# Patient Record
Sex: MALE | Race: WHITE | Employment: OTHER | URBAN - METROPOLITAN AREA
[De-identification: names, ages, dates, MRNs, and addresses within clinical notes are randomized per-mention and may not be internally consistent; named-entity substitution may affect disease eponyms.]

---

## 2023-02-06 ENCOUNTER — OFFICE VISIT (OUTPATIENT)
Dept: ORTHOPEDIC SURGERY | Age: 78
End: 2023-02-06

## 2023-02-06 VITALS — BODY MASS INDEX: 29.96 KG/M2 | WEIGHT: 214 LBS | HEIGHT: 71 IN

## 2023-02-06 DIAGNOSIS — M19.072 PRIMARY OSTEOARTHRITIS OF LEFT FOOT: ICD-10-CM

## 2023-02-06 DIAGNOSIS — L03.119 CELLULITIS OF FOOT: ICD-10-CM

## 2023-02-06 DIAGNOSIS — M20.5X2 ACQUIRED CLAW TOE OF LEFT FOOT: ICD-10-CM

## 2023-02-06 DIAGNOSIS — M79.675 PAIN OF TOE OF LEFT FOOT: Primary | ICD-10-CM

## 2023-02-06 DIAGNOSIS — M20.22 HALLUX RIGIDUS OF LEFT FOOT: ICD-10-CM

## 2023-02-06 RX ORDER — PIOGLITAZONEHYDROCHLORIDE 45 MG/1
TABLET ORAL
COMMUNITY
Start: 2022-12-29

## 2023-02-06 RX ORDER — COLCHICINE 0.6 MG/1
0.6 TABLET ORAL 2 TIMES DAILY
Qty: 28 TABLET | Refills: 0 | Status: SHIPPED | OUTPATIENT
Start: 2023-02-06 | End: 2023-02-20

## 2023-02-06 RX ORDER — INSULIN ASPART 100 [IU]/ML
INJECTION, SOLUTION INTRAVENOUS; SUBCUTANEOUS
COMMUNITY
Start: 2022-11-28

## 2023-02-06 RX ORDER — AMOXICILLIN AND CLAVULANATE POTASSIUM 875; 125 MG/1; MG/1
1 TABLET, FILM COATED ORAL 2 TIMES DAILY
Qty: 28 TABLET | Refills: 2 | Status: SHIPPED | OUTPATIENT
Start: 2023-02-06 | End: 2023-02-20

## 2023-02-06 RX ORDER — INSULIN DETEMIR 100 [IU]/ML
65 INJECTION, SOLUTION SUBCUTANEOUS 2 TIMES DAILY
COMMUNITY
Start: 2022-04-29

## 2023-02-06 NOTE — PROGRESS NOTES
Name: Coy Krishna  YOB: 1945  Gender: male  MRN: [id number]     CC: Left foot pain    HPI:   02/03/2023: He was bird hunting and noticed left foot pain. 02/06/2023: Presents with a left dorsal 1-2 forefoot pain    ROS/Meds/PSH/PMH/FH/SH: reviewed today    Tobacco:  reports that he has quit smoking. His smoking use included cigarettes. He has quit using smokeless tobacco.     Physical Examination:  Patient appears to be alert and oriented with acceptable appearance. No obvious distress or SOB  CV: appears to have acceptable vascular color and capillary refill  Neuro: appears to have mostly intact light touch sensation   Skin: Left 1-2 dorsal webspace redness; no open lesions; no webspace moisture; no eschar   MS: Standing: Plantigrade: Gait protective  Left = 1-2 webspace/first MTP pain; no lesser metatarsal pain    XR: Left side: Standing AP lateral oblique foot taken today with hallux rigidus; retained fibular plate; tibial 2-5 toes; healed second metatarsal stress fracture  XR Impression:  As above      Reviewed Test/Records/Documents:   06/06/2012: Left lateral malleolar ORIF Dr. Leatha Mcqueen  06/06/2022: Dr. Heavenly Hooper reflects: Left second toe pressure. Discussed taping and insoles    Injection: Not applicable    Assessment:    Left dorsal 1-2 webspace/forefoot pain, redness, inflammation, cellulitis  Left hallux rigidus     Plan:   The patient and I discussed the above assessment. We explored treatment options.      He irritated his foot bird hunting   Potentially this is a gout arthritic flare or stress fracture, but with the degree of swelling and redness in the dorsal 1-2 webspace, I am concerned about infection  He has no evidence for webspace maceration ulcer and no evidence of any penetrating foreign body  I hope to resolve his situation with both Colchicine and Augmentin as well as protecting with 3D boot    Advanced medical imaging: No indication today for MRI scan  DME: Right even up: Left 3D boot  We discussed foot care and boot protection  Orthotic/prosthetic: Potential future       Medication - OTC meds prn: Prescribed:  Augmentin 875 m p.o. twice daily: #14 days: 2 refills  Colchicine 0.6 m p.o. twice daily: # 14 days: No refills  We discussed his diabetes and kidneys. He will check with Dr. Tiny Bolanos regarding the Colchicine   Surgical discussion: No indication today for surgery  Follow up: 1 week: X-rays left foot standing  Work status: Out of work    This note was created using Dragon voice recognition software which may result in errors of speech and spelling recognition and word/phrase syntax errors.

## 2023-02-06 NOTE — LETTER
DME Patient Authorization Form    Name: Rosangela Boucher  : 1945  MRN: [id number]   Age: 65 y.o. Gender: male  Delivery Address: [facility]     Diagnosis:     ICD-10-CM    1. Pain of toe of left foot  M79.675 XR FOOT LEFT (MIN 3 VIEWS)     Walker Boot ()     Even UP ()      2. Acquired claw toe of left foot  M20.5X2 Walker Boot ()     Even UP ()      3. Primary osteoarthritis of left foot  M19.072 Walker Boot ()     Even UP ()      4. Hallux rigidus of left foot  M20.22 Walker Boot ()     Even UP ()      5. Cellulitis of foot  L03.119 Gamaliel Gains ()     Even UP ()           Requested DME:  Even-Ups - ** ($30) X 1 - right  Walker Boot -  ($290.00) X 1 - left        Clinical Notes:     **Indicates non-covered items by insurance. Payment expected on date of service. Electronically signed by  Provider: Elroy Geronimo MD__Date: 2023                            [facility] Tax ID # [id number]        Durable Medical Equipment and/or Orthotics Patient Consent     I understand that my physician has prescribed this medical supply as part of my treatment plan as a matter of Medical Necessity.  I understand that I have a choice in where I receive my prescribed orthopedic supplies and/or services.  I authorize [facility] to furnish this service/product and to provide my insurance carrier with any information requested in order to process for payment.  I instruct my insurance carrier to pay [facility] directly for these services/products.  I understand that my insurance carrier may deny payment for this supply because it is a non-covered item, deemed not medically necessary or considered experimental.   I understand that any cost not covered by my insurance carrier will be solely my financial responsibility.    I have received the Supplier Standards and have reviewed them.  I have received the prescribed item and have been fully instructed on the proper use of the above services/products.    ______ (Patient Initials) I understand that all DME items are non-returnable after being dispensed. Items still in sealed packaging may be returned up to 14 days after purchasing. [address] will replace items that are defective.    ______ (Patient Initials) I understand that [facility] will not file a claim with my insurance carrier for this service/product and I am waiving my right to file a claim on my own for this service/product with my insurance company as this item is NON-COVERED (Denoted by the **) by my Insurance company/policy. ______ (Patient Initials) I understand that I am responsible to bring my equipment to the hospital for any surgery. ______________________________________________  ________________________  Patient / Alisa Martinez            Thank you for considering [address] Your physician has prescribed specific medical equipment or devices for your home use. The following describes your rights and responsibilities as our customer. Right to Choose Providers: You have a choice regarding which company supplies your home medical equipment and devices, and to consult your physician in this decision. You may choose a medical supply store, a home medical equipment provider, or a specialist such as [facility]. [facility] will coordinate with your physician to provide the medical equipment or devices prescribed for your home use. Right to Service:  You have the right to considerate, respectful and nondiscriminatory care. You have the right to receive accurate and easily understood information about your health care.   If you speak a foreign language, or don't understand the discussions, assistance will be provided to allow you to make informed health care decisions. You have the right to know your treatment options and to participate in decisions about your care, including the right to accept or refuse treatment. You have the right to expect a reasonable response to your requests for treatment or service. You have the right to talk in confidence with health care providers and to have your health care information protected. You have the right to receive an explanation of your bill. You have the right to complain about the service or product you receive. Patient Responsibilities:  Please provide complete and accurate information about your health insurance benefits and make arrangements for the timely payment of your bill. [facility] will, if possible, assume responsibility for billing your insurance (Medicare, Medicaid and commercial) for the prescribed equipment or devices. If your policy does not cover the prescribed product, or only covers a portion of the bill, you are responsible for any remaining balance. Return and Exchange Policy:  [facility] will honor published  Warranties for products. [facility] will accept returns or exchanges within 14 days from the date of receipt, providin) the product must be in new condition; 2) receipt as required; and 3) used disposable and hygiene products may only be returned due to a defective product. Note: Refunds will be issued in a timely manner, please allow 4-6 weeks for processing. Complaint Procedures and DME Consumer Protection Resources:  [facility] values you as a customer, and is committed to resolving patient concerns. This commitment includes understanding and documenting your concerns, conducting a review of internal procedures, and providing you with an explanation and resolution to your concerns.   Should you have any questions about our services or billing process, please contact our office at (practice phone number). If we are unable to resolve the concern, you have the right to direct comments to the office of Consumer Protection, in the [address] or the [facility] office, without fear of repercussion. DMEPOS SUPPLIER STANDARDS    A supplier must be in compliance with all applicable Federal and [facility] and regulatory requirements. A supplier must provide complete and accurate information on the DMEPOS supplier application. Any changes to this information must be reported to the [facility] within 30 days. An authorized individual (one whose signature is binding) must sign the application for billing privileges. A supplier must fill orders from its own inventory, or must contract with other companies for the purchase of items necessary to fill the order. A supplier may not contract with any entity that is currently excluded from the Medicare program, any [facility] program, or from any other Federal procurement or Nonprocurement programs. A supplier must advise beneficiaries that they may rent or purchase inexpensive or routinely purchased durable medical equipment, and of the purchase option for capped rental equipment. A supplier must notify beneficiaries of warranty coverage and honor all warranties under applicable State Law, and repair or replace free of charge Medicare covered items that are under warranty. A supplier must maintain a physical facility on an appropriate site. A supplier must permit CMS, or its agents to conduct on-site inspections to ascertain the supplier's compliance with these standards. The supplier location must be accessible to beneficiaries during reasonable business hours, and must maintain a visible sign and posted hours of operation.   A supplier must maintain a primary business telephone listed under the name of the business in a [employer] or a toll free number available through directory assistance. The exclusive use of a beeper, answering machine or cell phone is prohibited. A supplier must have comprehensive liability insurance in the amount of at least $300,000 that covers both the supplier's place of business and all customers and employees of the supplier. If the supplier manufactures its own items, this insurance must also cover product liability and completed operations. A supplier must agree not to initiate telephone contact with beneficiaries, with a few exceptions allowed. This standard prohibits suppliers from calling beneficiaries in order to solicit new business. A supplier is responsible for delivery and must instruct beneficiaries on use of Medicare covered items, and maintain proof of delivery. A supplier must answer questions, and respond to complaints of the beneficiaries, and maintain documentation of such contacts. A supplier must maintain and replace at no charge or repair directly, or through a service contract with another company, Medicare covered items it has rented to beneficiaries. A supplier must accept returns of substandard (less than full quality for the particular item) or unsuitable items (inappropriate for the beneficiary at the time it was fitted and rented or sold) from beneficiaries. A supplier must disclose these supplier standards to each beneficiary to whom it supplies a Medicare-covered item. A supplier must disclose to the government any person having ownership, financial, or control interest in the supplier. A supplier must not convey or reassign a supplier number; i.e., the supplier may not sell or allow another entity to use its Medicare billing number. A supplier must have a complaint resolution protocol established to address beneficiary complaints that relate to these standards. A record of these complaints must be maintained at the physical facility.   Complaint records must include: the name, address, telephone number and health insurance claim number of the beneficiary, a summary of the complaint, and any action taken to resolve it. A supplier must agree to furnish CMS any information required by the Medicare statute and implementing regulations. A supplier of DMEPOS and other items and services must be accredited by a CMS-approved accreditation organization in order to receive and retain a supplier billing number. The accreditation must indicate the specific products and services, for which the supplier is accredited in order for the supplier to receive payment for those specific products and services. A DMEPOS supplier must notify their accreditation organization when a new DMEPOS location is opened. The accreditation organization may accredit the new supplier location for three months after it is operational without requiring a new site visit. All DMEPOS supplier locations, whether owned or subcontracted, must meet the [employer] and be separately accredited in order to bill Medicare. An accredited supplier may be denied enrollment or their enrollment may be revoked, if CMS determines that they are not in compliance with the DMEPOS quality standards. A DMEPOS supplier must disclose upon enrollment all products and services, including the addition of new product lines for which they are seeking accreditation. If a new product line is added after enrollment, the DMEPOS supplier will be responsible for notifying the accrediting body of the new product so that the DMEPOS supplier can be re-surveyed and accredited for these new products. Must meet the surety bond requirements specified in 42 C. F.R. 424.57(c). Implementation date- May 4, 2009. A supplier must obtain oxygen from a state-licensed oxygen supplier. A supplier must maintain ordering and referring documentation consistent with provisions found in 42 C. F.R. 424.516(f).   DMEPOS suppliers are prohibited from sharing a practice location with certain other Medicare providers and suppliers. DMEPOS suppliers must remain open to the public for a minimum of 30 hours per week with certain exceptions.

## 2023-02-06 NOTE — PROGRESS NOTES
The patient was prescribed a walker boot for the patient's left foot. The patient wears a size 11 shoe and I fitted them with a L size boot. The patient was fitted and instructed on the use of prescribed walker boot. I explained how to fit themselves and that the plastic flexible piece should always be on the front of the boot and secured by the Velcro straps on top. The air bladder in the boot was adjusted according to proper fit and comfort. The patient walked a short distance and acknowledged satisfaction with current fit. I also explained that they need a heel lift or a higher heeled shoe for the uninvolved LE to help normalize gait and avoid excessive low back stress/strain due to leg length inequality created from walker boot. The patient was also prescribed an even-up for the right side, size large. Patient read and signed documenting they understand and agree to [facility]'s current DME return policy. negative Alert & oriented; no sensory, motor or coordination deficits, normal reflexes

## 2023-02-13 ENCOUNTER — OFFICE VISIT (OUTPATIENT)
Dept: ORTHOPEDIC SURGERY | Age: 78
End: 2023-02-13

## 2023-02-13 VITALS — HEIGHT: 71 IN | WEIGHT: 214 LBS | BODY MASS INDEX: 29.96 KG/M2

## 2023-02-13 DIAGNOSIS — M20.5X2 ACQUIRED CLAW TOE OF LEFT FOOT: ICD-10-CM

## 2023-02-13 DIAGNOSIS — M79.675 PAIN OF TOE OF LEFT FOOT: Primary | ICD-10-CM

## 2023-02-13 DIAGNOSIS — M19.072 PRIMARY OSTEOARTHRITIS OF LEFT FOOT: ICD-10-CM

## 2023-02-13 DIAGNOSIS — M20.22 HALLUX RIGIDUS OF LEFT FOOT: ICD-10-CM

## 2023-02-13 DIAGNOSIS — L03.119 CELLULITIS OF FOOT: ICD-10-CM

## 2023-02-13 NOTE — PROGRESS NOTES
The patient was prescribed and fitted with a carbon fiber shoe insert for the left foot, size large. Patient read and signed documenting they understand and agree to [facility]'s current DME return policy.

## 2023-02-13 NOTE — PROGRESS NOTES
Name: Sravani Briceño  YOB: 1945  Gender: male  MRN: [id number]     02/13/2023: He returns pain-free. HPI:   02/03/2023: He was bird hunting and noticed left foot pain. 02/06/2023: Initial visit for: Left dorsal 1-2 forefoot pain    ROS/Meds/PSH/PMH/FH/SH: reviewed today    Tobacco:  reports that he has quit smoking. His smoking use included cigarettes. He has quit using smokeless tobacco.     Physical Examination:  Patient appears to be alert and oriented with acceptable appearance. No obvious distress or SOB  CV: appears to have acceptable vascular color and capillary refill  Neuro: appears to have mostly intact light touch sensation   Skin: Resolved left 1-2 dorsal webspace redness; no webspace moisture   MS: Standing: Plantigrade: Gait is full  Left = resolved 1-2 webspace/first MTP pain; no MTP or metatarsal pain    XR: Left side: Standing AP lateral oblique foot taken today compared to his films 02/06/2023 with no acute changes; hallux rigidus; retained fibular plate; tibial 2-5 toes; healed second metatarsal stress fracture  XR Impression:  As above      Reviewed Test/Records/Documents:   06/06/2012: Left lateral malleolar ORIF Dr. Lisa Vanessa  06/06/2022: Dr. Uche Lee reflects: Left second toe pressure. Discussed taping and insoles    Injection: Not applicable    Assessment:    Left dorsal 1-2 webspace/forefoot pain, redness, inflammation, cellulitis - resolved  Left hallux rigidus - stable     Plan:   The patient and I discussed the above assessment. We explored treatment options.      He either had a stress reaction that created significant cellulitis or his arthritic big toe created an inflammatory situation  Either way, the combination of Augmentin, Colchicine and 3D boot have relieved/resolved all of his symptoms    He has no evidence for webspace maceration ulcer and no evidence of any penetrating foreign body  Advanced medical imaging: No indication today for MRI scan  DME: Carbon fiber  We discussed foot care and carbon fiber protection due to his hallux rigidus  Orthotic/prosthetic: Potential future       Medication - OTC meds prn: He will complete prior prescribed:  Augmentin 875 m p.o. twice daily: #14 days: 2 refills  Colchicine 0.6 m p.o. twice daily: # 14 days: No refills  We discussed his diabetes and kidneys. Surgical discussion: No indication today for surgery  Follow up: As needed  Work status: Out of work    This note was created using Dragon voice recognition software which may result in errors of speech and spelling recognition and word/phrase syntax errors.

## 2023-02-13 NOTE — LETTER
DME Patient Authorization Form    Name: Jacky Quinn  : 1945  MRN: [id number]   Age: 65 y.o. Gender: male  Delivery Address: [facility]     Diagnosis:     ICD-10-CM    1. Pain of toe of left foot  M79.675 XR FOOT LEFT (MIN 3 VIEWS)     [employer] ()      2. Acquired claw toe of left foot  M20.5X2 XR FOOT LEFT (MIN 3 VIEWS)     [employer] ()      3. Primary osteoarthritis of left foot  M19.072 XR FOOT LEFT (MIN 3 VIEWS)     [employer] ()      4. Hallux rigidus of left foot  M20.22 XR FOOT LEFT (MIN 3 VIEWS)     [employer] Insert ()      5. Cellulitis of foot  L03.119 XR FOOT LEFT (MIN 3 VIEWS)     [employer] Insert ()           Requested DME:  [employer] -  ($30) X 1 - left        Clinical Notes:     **Indicates non-covered items by insurance. Payment expected on date of service. Electronically signed by  Provider: Smith Stauffer MD__Date: 2023                            Formerly [facility]/[address] Tax ID # [id number]        Durable Medical Equipment and/or Orthotics Patient Consent     I understand that my physician has prescribed this medical supply as part of my treatment plan as a matter of Medical Necessity.  I understand that I have a choice in where I receive my prescribed orthopedic supplies and/or services.  I authorize [facility] to furnish this service/product and to provide my insurance carrier with any information requested in order to process for payment.  I instruct my insurance carrier to pay [facility] directly for these services/products.    I understand that my insurance carrier may deny payment for this supply because it is a non-covered item, deemed not medically necessary or considered experimental.   I understand that any cost not covered by my insurance carrier will be solely my financial responsibility.  I have received the Supplier Standards and have reviewed them.  I have received the prescribed item and have been fully instructed on the proper use of the above services/products.    ______ (Patient Initials) I understand that all DME items are non-returnable after being dispensed. Items still in sealed packaging may be returned up to 14 days after purchasing. [address] will replace items that are defective.    ______ (Patient Initials) I understand that [facility] will not file a claim with my insurance carrier for this service/product and I am waiving my right to file a claim on my own for this service/product with my insurance company as this item is NON-COVERED (Denoted by the **) by my Insurance company/policy. ______ (Patient Initials) I understand that I am responsible to bring my equipment to the hospital for any surgery. ______________________________________________  ________________________  Patient / Deetta Prime            Thank you for considering [address] Your physician has prescribed specific medical equipment or devices for your home use. The following describes your rights and responsibilities as our customer. Right to Choose Providers: You have a choice regarding which company supplies your home medical equipment and devices, and to consult your physician in this decision. You may choose a medical supply store, a home medical equipment provider, or a specialist such as [facility]. [facility] will coordinate with your physician to provide the medical equipment or devices prescribed for your home use. Right to Service:  You have the right to considerate, respectful and nondiscriminatory care. You have the right to receive accurate and easily understood information about your health care.   If you speak a foreign language, or don't understand the discussions, assistance will be provided to allow you to make informed health care decisions. You have the right to know your treatment options and to participate in decisions about your care, including the right to accept or refuse treatment. You have the right to expect a reasonable response to your requests for treatment or service. You have the right to talk in confidence with health care providers and to have your health care information protected. You have the right to receive an explanation of your bill. You have the right to complain about the service or product you receive. Patient Responsibilities:  Please provide complete and accurate information about your health insurance benefits and make arrangements for the timely payment of your bill. [facility] will, if possible, assume responsibility for billing your insurance (Medicare, Medicaid and commercial) for the prescribed equipment or devices. If your policy does not cover the prescribed product, or only covers a portion of the bill, you are responsible for any remaining balance. Return and Exchange Policy:  [facility] will honor published  Warranties for products. [facility] will accept returns or exchanges within 14 days from the date of receipt, providin) the product must be in new condition; 2) receipt as required; and 3) used disposable and hygiene products may only be returned due to a defective product. Note: Refunds will be issued in a timely manner, please allow 4-6 weeks for processing. Complaint Procedures and DME Consumer Protection Resources:  [facility] values you as a customer, and is committed to resolving patient concerns. This commitment includes understanding and documenting your concerns, conducting a review of internal procedures, and providing you with an explanation and resolution to your concerns.   Should you have any questions about our services or billing process, please contact our office at (practice phone number). If we are unable to resolve the concern, you have the right to direct comments to the office of Consumer Protection, in the [address] or the [facility] office, without fear of repercussion. DMEPOS SUPPLIER STANDARDS    A supplier must be in compliance with all applicable Federal and [facility] and regulatory requirements. A supplier must provide complete and accurate information on the DMEPOS supplier application. Any changes to this information must be reported to the [facility] within 30 days. An authorized individual (one whose signature is binding) must sign the application for billing privileges. A supplier must fill orders from its own inventory, or must contract with other companies for the purchase of items necessary to fill the order. A supplier may not contract with any entity that is currently excluded from the Medicare program, any [facility] program, or from any other Federal procurement or Nonprocurement programs. A supplier must advise beneficiaries that they may rent or purchase inexpensive or routinely purchased durable medical equipment, and of the purchase option for capped rental equipment. A supplier must notify beneficiaries of warranty coverage and honor all warranties under applicable State Law, and repair or replace free of charge Medicare covered items that are under warranty. A supplier must maintain a physical facility on an appropriate site. A supplier must permit CMS, or its agents to conduct on-site inspections to ascertain the supplier's compliance with these standards. The supplier location must be accessible to beneficiaries during reasonable business hours, and must maintain a visible sign and posted hours of operation.   A supplier must maintain a primary business telephone listed under the name of the business in a [employer] or a toll free number available through [employer] assistance. The exclusive use of a beeper, answering machine or cell phone is prohibited. A supplier must have comprehensive liability insurance in the amount of at least $300,000 that covers both the supplier's place of business and all customers and employees of the supplier. If the supplier manufactures its own items, this insurance must also cover product liability and completed operations. A supplier must agree not to initiate telephone contact with beneficiaries, with a few exceptions allowed. This standard prohibits suppliers from calling beneficiaries in order to solicit new business. A supplier is responsible for delivery and must instruct beneficiaries on use of Medicare covered items, and maintain proof of delivery. A supplier must answer questions, and respond to complaints of the beneficiaries, and maintain documentation of such contacts. A supplier must maintain and replace at no charge or repair directly, or through a service contract with another company, Medicare covered items it has rented to beneficiaries. A supplier must accept returns of substandard (less than full quality for the particular item) or unsuitable items (inappropriate for the beneficiary at the time it was fitted and rented or sold) from beneficiaries. A supplier must disclose these supplier standards to each beneficiary to whom it supplies a Medicare-covered item. A supplier must disclose to the government any person having ownership, financial, or control interest in the supplier. A supplier must not convey or reassign a supplier number; i.e., the supplier may not sell or allow another entity to use its Medicare billing number. A supplier must have a complaint resolution protocol established to address beneficiary complaints that relate to these standards. A record of these complaints must be maintained at the physical facility.   Complaint records must include: the name, address, telephone number and health insurance claim number of the beneficiary, a summary of the complaint, and any action taken to resolve it. A supplier must agree to furnish CMS any information required by the Medicare statute and implementing regulations. A supplier of DMEPOS and other items and services must be accredited by a CMS-approved accreditation organization in order to receive and retain a supplier billing number. The accreditation must indicate the specific products and services, for which the supplier is accredited in order for the supplier to receive payment for those specific products and services. A DMEPOS supplier must notify their accreditation organization when a new DMEPOS location is opened. The accreditation organization may accredit the new supplier location for three months after it is operational without requiring a new site visit. All DMEPOS supplier locations, whether owned or subcontracted, must meet the [employer] and be separately accredited in order to bill Medicare. An accredited supplier may be denied enrollment or their enrollment may be revoked, if CMS determines that they are not in compliance with the DMEPOS quality standards. A DMEPOS supplier must disclose upon enrollment all products and services, including the addition of new product lines for which they are seeking accreditation. If a new product line is added after enrollment, the DMEPOS supplier will be responsible for notifying the accrediting body of the new product so that the DMEPOS supplier can be re-surveyed and accredited for these new products. Must meet the surety bond requirements specified in 42 C. F.R. 424.57(c). Implementation date- May 4, 2009. A supplier must obtain oxygen from a state-licensed oxygen supplier. A supplier must maintain ordering and referring documentation consistent with provisions found in 42 C. F.R. 424.516(f).   DMEPOS suppliers are prohibited from sharing a practice location with certain other Medicare providers and suppliers. DMEPOS suppliers must remain open to the public for a minimum of 30 hours per week with certain exceptions.